# Patient Record
Sex: FEMALE
[De-identification: names, ages, dates, MRNs, and addresses within clinical notes are randomized per-mention and may not be internally consistent; named-entity substitution may affect disease eponyms.]

---

## 2021-09-28 ENCOUNTER — HOSPITAL ENCOUNTER (EMERGENCY)
Dept: HOSPITAL 5 - ED | Age: 26
LOS: 1 days | Discharge: HOME | End: 2021-09-29
Payer: COMMERCIAL

## 2021-09-28 DIAGNOSIS — Z20.2: ICD-10-CM

## 2021-09-28 DIAGNOSIS — N94.6: ICD-10-CM

## 2021-09-28 DIAGNOSIS — N39.0: ICD-10-CM

## 2021-09-28 DIAGNOSIS — N93.8: Primary | ICD-10-CM

## 2021-09-28 PROCEDURE — 81001 URINALYSIS AUTO W/SCOPE: CPT

## 2021-09-28 PROCEDURE — 99284 EMERGENCY DEPT VISIT MOD MDM: CPT

## 2021-09-28 PROCEDURE — 36415 COLL VENOUS BLD VENIPUNCTURE: CPT

## 2021-09-28 PROCEDURE — 85025 COMPLETE CBC W/AUTO DIFF WBC: CPT

## 2021-09-28 PROCEDURE — 96372 THER/PROPH/DIAG INJ SC/IM: CPT

## 2021-09-28 PROCEDURE — 84703 CHORIONIC GONADOTROPIN ASSAY: CPT

## 2021-09-28 PROCEDURE — 80053 COMPREHEN METABOLIC PANEL: CPT

## 2021-09-28 PROCEDURE — 87086 URINE CULTURE/COLONY COUNT: CPT

## 2021-09-28 PROCEDURE — 99283 EMERGENCY DEPT VISIT LOW MDM: CPT

## 2021-09-29 VITALS — SYSTOLIC BLOOD PRESSURE: 120 MMHG | DIASTOLIC BLOOD PRESSURE: 68 MMHG

## 2021-09-29 LAB
ALBUMIN SERPL-MCNC: 4.6 G/DL (ref 3.9–5)
ALT SERPL-CCNC: 12 UNITS/L (ref 7–56)
BASOPHILS # (AUTO): 0.1 K/MM3 (ref 0–0.1)
BASOPHILS NFR BLD AUTO: 0.7 % (ref 0–1.8)
BILIRUB UR QL STRIP: (no result)
BLOOD UR QL VISUAL: (no result)
BUN SERPL-MCNC: 14 MG/DL (ref 7–17)
BUN/CREAT SERPL: 23 %
CALCIUM SERPL-MCNC: 9.6 MG/DL (ref 8.4–10.2)
EOSINOPHIL # BLD AUTO: 0.3 K/MM3 (ref 0–0.4)
EOSINOPHIL NFR BLD AUTO: 3.8 % (ref 0–4.3)
HCT VFR BLD CALC: 36.8 % (ref 30.3–42.9)
HEMOLYSIS INDEX: 1
HGB BLD-MCNC: 12.4 GM/DL (ref 10.1–14.3)
LYMPHOCYTES # BLD AUTO: 2.4 K/MM3 (ref 1.2–5.4)
LYMPHOCYTES NFR BLD AUTO: 30.9 % (ref 13.4–35)
MCHC RBC AUTO-ENTMCNC: 34 % (ref 30–34)
MCV RBC AUTO: 84 FL (ref 79–97)
MONOCYTES # (AUTO): 0.5 K/MM3 (ref 0–0.8)
MONOCYTES % (AUTO): 6.4 % (ref 0–7.3)
MUCOUS THREADS #/AREA URNS HPF: (no result) /HPF
PH UR STRIP: 5 [PH] (ref 5–7)
PLATELET # BLD: 206 K/MM3 (ref 140–440)
PROT UR STRIP-MCNC: (no result) MG/DL
RBC # BLD AUTO: 4.4 M/MM3 (ref 3.65–5.03)
RBC #/AREA URNS HPF: 3 /HPF (ref 0–6)
UROBILINOGEN UR-MCNC: < 2 MG/DL (ref ?–2)
WBC #/AREA URNS HPF: 32 /HPF (ref 0–6)

## 2021-09-29 NOTE — EMERGENCY DEPARTMENT REPORT
ED Female  HPI





- General


Chief complaint: Vaginal Bleeding


Stated complaint: ABNORMAL VAGINAL BLEEDING,LOWER ABD PAIN


Source: patient


Mode of arrival: Ambulatory


Limitations: No Limitations





- History of Present Illness


Initial comments: 





Patient is a A4 25-year-old white female with no past medical history 

presents to the ED with complaint of acute onset persistent suprapubic pain and 

heavy vaginal bleeding for the last 2 weeks.  Patient states that in the last 5 

days she noticed that there were large blood clots with heavy bleeding.  Patient

states that her menstrual cycles usually last for 5 days and that they were 

never heavy.  Patient states that her last sexual intercourse was over 2 months 

ago and that she is not currently sexually active.  Patient states that at the 

time the sexual intercourse was protected.  Patient denies dysuria, urinary 

frequency and urgency, vaginal discharge, low back pain, chest pain, shortness 

of breath, diarrhea, nausea and vomiting, fever and chills or low back pain.


MD Complaint: vaginal bleeding, pelvic pain (suprapubic), other 

(Lightheadedness)


-: Sudden, week(s) (2)


Location: suprapubic, other (vaginal )


Radiation: non-radiating


Severity: severe


Severity scale (0 -10): 7


Quality: cramping, sharp


Consistency: constant


Improves with: none


Worsens with: menstrual period


Are you Pregnant Now?: No


Last Menstrual Period: 21


EDC: 22


Associated Symptoms: denies other symptoms, vaginal bleeding, abdominal pain 

(suprapubic), weakness, other (vaginal irritation).  denies: vaginal discharge, 

nausea/vomiting, fever/chills, headaches, loss of appetite, dysuria, hematuria, 

rash, seizure, shortness of breath, syncope





- Related Data


Sexually active: No


: 4


Para: 0


A: 4


                                  Previous Rx's











 Medication  Instructions  Recorded  Last Taken  Type


 


Doxycycline Hyclate 100 mg PO Q12H #28 capsule 21 Unknown Rx


 


Fluconazole [Diflucan TAB] 200 mg PO QDAY #2 tablet 21 Unknown Rx


 


Ibuprofen [Motrin] 800 mg PO Q8HR PRN #30 tablet 21 Unknown Rx


 


medroxyPROGESTERone ACETATE 10 mg PO DAILY #14 tablet 21 Unknown Rx





[Provera]    











                                    Allergies











Allergy/AdvReac Type Severity Reaction Status Date / Time


 


No Known Allergies Allergy   Verified 21 00:10














ED Review of Systems


ROS: 


Stated complaint: ABNORMAL VAGINAL BLEEDING,LOWER ABD PAIN


Other details as noted in HPI





Constitutional: denies: chills, fever


Eyes: denies: eye pain, eye discharge, vision change


ENT: denies: ear pain, throat pain


Respiratory: denies: cough, shortness of breath, wheezing


Cardiovascular: denies: chest pain, palpitations


Endocrine: no symptoms reported


Gastrointestinal: abdominal pain (suprapubic pain).  denies: nausea, vomiting, 

diarrhea


Genitourinary: abnormal menses (vaginal bleeding), other (vaginal irritation).  

denies: urgency, dysuria, frequency, hematuria, discharge


Musculoskeletal: denies: back pain, joint swelling, arthralgia


Skin: denies: rash, lesions


Neurological: denies: headache, weakness, paresthesias


Psychiatric: denies: anxiety, depression


Hematological/Lymphatic: denies: easy bleeding, easy bruising





ED Past Medical Hx





- Past Medical History


Previous Medical History?: No





- Medications


Home Medications: 


                                Home Medications











 Medication  Instructions  Recorded  Confirmed  Last Taken  Type


 


Doxycycline Hyclate 100 mg PO Q12H #28 capsule 21  Unknown Rx


 


Fluconazole [Diflucan TAB] 200 mg PO QDAY #2 tablet 21  Unknown Rx


 


Ibuprofen [Motrin] 800 mg PO Q8HR PRN #30 tablet 21  Unknown Rx


 


medroxyPROGESTERone ACETATE 10 mg PO DAILY #14 tablet 21  Unknown Rx





[Provera]     














ED Physical Exam





- General


Limitations: No Limitations


General appearance: alert, in no apparent distress





- Head


Head exam: Present: atraumatic, normocephalic, normal inspection





- Eye


Eye exam: Present: normal appearance, PERRL, EOMI


Pupils: Present: normal accommodation





- ENT


ENT exam: Present: normal exam, normal orophraynx, mucous membranes moist, TM's 

normal bilaterally, normal external ear exam





- Neck


Neck exam: Present: normal inspection, full ROM





- Respiratory


Respiratory exam: Present: normal lung sounds bilaterally.  Absent: respiratory 

distress, wheezes, rales, rhonchi, stridor, chest wall tenderness, accessory 

muscle use, decreased breath sounds, prolonged expiratory





- Cardiovascular


Cardiovascular Exam: Present: regular rate, normal rhythm, normal heart sounds. 

Absent: systolic murmur, diastolic murmur, rubs, gallop





- GI/Abdominal


GI/Abdominal exam: Present: soft, tenderness (Palpable mild suprapubic 

tenderness), normal bowel sounds.  Absent: guarding, rebound, hyperactive bowel 

sounds, hypoactive bowel sounds, organomegaly





- 


Bi-manual exam: Present: other (Pelvic exam declined)





- Extremities Exam


Extremities exam: Present: normal inspection, full ROM, normal capillary refill





- Back Exam


Back exam: Present: normal inspection, full ROM.  Absent: tenderness, CVA 

tenderness (R), CVA tenderness (L), muscle spasm, paraspinal tenderness, 

vertebral tenderness





- Neurological Exam


Neurological exam: Present: alert, oriented X3, CN II-XII intact, normal gait, 

reflexes normal





- Psychiatric


Psychiatric exam: Present: normal affect, normal mood





- Skin


Skin exam: Present: warm, dry, intact, normal color.  Absent: rash





ED Course


                                   Vital Signs











  21





  00:11


 


Temperature 98.8 F


 


Pulse Rate 60


 


Respiratory 20





Rate 


 


Blood Pressure 120/68


 


O2 Sat by Pulse 100





Oximetry 














ED Medical Decision Making





- Lab Data


Result diagrams: 


                                 21 00:50





                                 21 00:50





- Medical Decision Making





This is a A4 25-year-old white female with no past medical history presents 

to the ED with complaint of acute onset persistent suprapubic pain and heavy 

vaginal bleeding for the last 2 weeks.  Patient states that in the last 5 days 

she noticed that there were large blood clots with heavy bleeding.  Patient 

states that her menstrual cycles usually last for 5 days and that they were 

never heavy.  Patient states that her last sexual intercourse was over 2 months 

ago and that she is not currently sexually active.  Patient states that at the 

time the sexual intercourse was protected.  In the ED, patient is alert and 

oriented x3 and is not in any distress with normal vital signs.  Lab test r

esults were reviewed and are all nonactionable.  Patient was treated in the ED 

with medroxyprogesterone acetate 10 mg tablet, also treated for pain and 

empirically treated for suspected STD with Rocephin 1 g intramuscular injection.

 Patient was discharged home on a prescription of doxycycline 100 mg every 12 

hours for 14 days.  Patient was advised to follow-up with her OB/GYN physician 

in 7 to 10 days for reevaluation or return to the ED immediately if symptoms get

worse.





- Differential Diagnosis


UTI; dysmenorrhea; metrorrhagia; STD; bacterial vaginosis;


Critical care attestation.: 


If time is entered above; I have spent that time in minutes in the direct care 

of this critically ill patient, excluding procedure time.








ED Disposition


Clinical Impression: 


 Dysfunctional uterine hemorrhage, Severe dysmenorrhea, Acute urinary tract 

infection, Possible exposure to STD





Disposition: 01 HOME / SELF CARE / HOMELESS


Is pt being admited?: No


Does the pt Need Aspirin: No


Condition: Stable


Instructions:  Metrorrhagia, Easy-to-Read, Urinary Tract Infection, Adult, 

Easy-to-Read, Dysmenorrhea, Safe Sex, Menorrhagia, Easy-to-Read, Abnormal 

Uterine Bleeding, Easy-to-Read


Additional Instructions: 


All lab test results were reviewed and are all nonactionable except for 

urinalysis that showed significant urinary tract infection.  Given the risk of 

exposure to STD, you are being treated also empirically for suspected STD.  

Therefore take medication with food, drink plenty of fluids and follow-up with 

your OB/GYN physician in 7 to 10 days for reevaluation.  Return to the ED 

immediately if symptoms get worse.


Prescriptions: 


Fluconazole [Diflucan TAB] 200 mg PO QDAY #2 tablet


Doxycycline Hyclate 100 mg PO Q12H #28 capsule


Ibuprofen [Motrin] 800 mg PO Q8HR PRN #30 tablet


 PRN Reason: Pain , Severe (7-10)


medroxyPROGESTERone ACETATE [Provera] 10 mg PO DAILY #14 tablet


Referrals: 


KATIE HUANG MD [Staff Physician] - 3-5 Days


Forms:  Work/School Release Form(ED)


Time of Disposition: 01:00


Print Language: ENGLISH

## 2024-02-18 ENCOUNTER — APPOINTMENT (OUTPATIENT)
Dept: RADIOLOGY | Facility: HOSPITAL | Age: 29
End: 2024-02-18
Payer: MEDICAID

## 2024-02-18 ENCOUNTER — HOSPITAL ENCOUNTER (EMERGENCY)
Facility: HOSPITAL | Age: 29
Discharge: HOME | End: 2024-02-18
Attending: EMERGENCY MEDICINE
Payer: MEDICAID

## 2024-02-18 ENCOUNTER — APPOINTMENT (OUTPATIENT)
Dept: CARDIOLOGY | Facility: HOSPITAL | Age: 29
End: 2024-02-18
Payer: MEDICAID

## 2024-02-18 VITALS
OXYGEN SATURATION: 99 % | TEMPERATURE: 100.2 F | DIASTOLIC BLOOD PRESSURE: 77 MMHG | HEIGHT: 67 IN | WEIGHT: 220 LBS | HEART RATE: 79 BPM | BODY MASS INDEX: 34.53 KG/M2 | RESPIRATION RATE: 16 BRPM | SYSTOLIC BLOOD PRESSURE: 131 MMHG

## 2024-02-18 DIAGNOSIS — J10.1 INFLUENZA A: Primary | ICD-10-CM

## 2024-02-18 DIAGNOSIS — R05.1 ACUTE COUGH: ICD-10-CM

## 2024-02-18 LAB
FLUAV RNA RESP QL NAA+PROBE: DETECTED
FLUBV RNA RESP QL NAA+PROBE: NOT DETECTED
RSV RNA RESP QL NAA+PROBE: NOT DETECTED
SARS-COV-2 RNA RESP QL NAA+PROBE: NOT DETECTED

## 2024-02-18 PROCEDURE — 93005 ELECTROCARDIOGRAM TRACING: CPT

## 2024-02-18 PROCEDURE — 2500000001 HC RX 250 WO HCPCS SELF ADMINISTERED DRUGS (ALT 637 FOR MEDICARE OP): Performed by: EMERGENCY MEDICINE

## 2024-02-18 PROCEDURE — 87637 SARSCOV2&INF A&B&RSV AMP PRB: CPT | Performed by: NURSE PRACTITIONER

## 2024-02-18 PROCEDURE — 71046 X-RAY EXAM CHEST 2 VIEWS: CPT

## 2024-02-18 PROCEDURE — 99283 EMERGENCY DEPT VISIT LOW MDM: CPT | Mod: 25

## 2024-02-18 RX ORDER — IBUPROFEN 600 MG/1
600 TABLET ORAL ONCE
Status: COMPLETED | OUTPATIENT
Start: 2024-02-18 | End: 2024-02-18

## 2024-02-18 RX ORDER — ACETAMINOPHEN 325 MG/1
650 TABLET ORAL ONCE
Status: COMPLETED | OUTPATIENT
Start: 2024-02-18 | End: 2024-02-18

## 2024-02-18 RX ORDER — BENZONATATE 100 MG/1
100 CAPSULE ORAL EVERY 8 HOURS
Qty: 21 CAPSULE | Refills: 0 | Status: SHIPPED | OUTPATIENT
Start: 2024-02-18 | End: 2024-02-25

## 2024-02-18 RX ADMIN — IBUPROFEN 600 MG: 600 TABLET, FILM COATED ORAL at 19:21

## 2024-02-18 RX ADMIN — ACETAMINOPHEN 650 MG: 325 TABLET ORAL at 19:21

## 2024-02-18 ASSESSMENT — PAIN SCALES - GENERAL: PAINLEVEL_OUTOF10: 10 - WORST POSSIBLE PAIN

## 2024-02-18 ASSESSMENT — PAIN DESCRIPTION - DESCRIPTORS
DESCRIPTORS: PRESSURE;ACHING
DESCRIPTORS: BURNING
DESCRIPTORS: ACHING;PRESSURE
DESCRIPTORS: BURNING

## 2024-02-18 ASSESSMENT — PAIN - FUNCTIONAL ASSESSMENT: PAIN_FUNCTIONAL_ASSESSMENT: 0-10

## 2024-02-18 ASSESSMENT — COLUMBIA-SUICIDE SEVERITY RATING SCALE - C-SSRS
6. HAVE YOU EVER DONE ANYTHING, STARTED TO DO ANYTHING, OR PREPARED TO DO ANYTHING TO END YOUR LIFE?: NO
1. IN THE PAST MONTH, HAVE YOU WISHED YOU WERE DEAD OR WISHED YOU COULD GO TO SLEEP AND NOT WAKE UP?: NO
2. HAVE YOU ACTUALLY HAD ANY THOUGHTS OF KILLING YOURSELF?: NO

## 2024-02-18 ASSESSMENT — LIFESTYLE VARIABLES
EVER FELT BAD OR GUILTY ABOUT YOUR DRINKING: NO
EVER HAD A DRINK FIRST THING IN THE MORNING TO STEADY YOUR NERVES TO GET RID OF A HANGOVER: NO
HAVE PEOPLE ANNOYED YOU BY CRITICIZING YOUR DRINKING: NO
HAVE YOU EVER FELT YOU SHOULD CUT DOWN ON YOUR DRINKING: NO

## 2024-02-18 ASSESSMENT — PAIN DESCRIPTION - LOCATION: LOCATION: CHEST

## 2024-02-18 ASSESSMENT — PAIN DESCRIPTION - PROGRESSION: CLINICAL_PROGRESSION: NOT CHANGED

## 2024-02-18 ASSESSMENT — PAIN DESCRIPTION - PAIN TYPE: TYPE: ACUTE PAIN

## 2024-02-18 ASSESSMENT — PAIN DESCRIPTION - ONSET: ONSET: ONGOING

## 2024-02-18 ASSESSMENT — PAIN DESCRIPTION - FREQUENCY: FREQUENCY: CONSTANT/CONTINUOUS

## 2024-02-18 NOTE — ED TRIAGE NOTES
TRIAGE NOTE   I saw the patient as the Clinician in Triage and performed a brief history and physical exam, established acuity, and ordered appropriate tests to develop basic plan of care. Patient will be seen by an [facility], resident and/or physician who will independently evaluate the patient. Please see subsequent provider notes for further details and disposition.     Brief HPI: In brief, Carmen Zhu is a 28 y.o. female that presents for cough, chest pain, chest congestion ongoing for the last 5 to 7 days.  She initially started with fevers.  Denies nausea vomiting diarrhea.  Former smoker recently quitting.    Focused Physical exam:   CONSTITUTIONAL: Vital signs reviewed as charted, well-developed and in no distress  Eyes: Extraocular muscles are intact. Pupils equal round and reactive to light. Conjunctiva are pink.    ENT: Mucous membranes are moist. Tongue in the midline. Pharynx was without erythema or exudates, uvula midline  LUNGS: Breath sounds equal and clear to auscultation. Good air exchange, no wheezes rales or retractions, pulse oximetry is charted.  HEART: Regular rate and rhythm without murmur thrill or rub, strong tones, auscultation is normal.  ABDOMEN: Soft and nontender without guarding rebound rigidity or mass. Bowel sounds are present and normal in all quadrants. There is no palpable masses or aneurysms identified. No hepatosplenomegaly, normal abdominal exam.  Neuro: The patient is awake, alert and oriented ×3. Moving all 4 extremities and answering questions appropriately.   MUSCULOSKELETAL: The calves are nontender to palpation. Full gross active range of motion.   PSYCH: Awake alert oriented, normal mood and affect.  Skin:  Dry, normal color, warm to the touch, no rash present.        Plan/MDM:   Viral swabs, chest x-ray, EKG  Please see subsequent provider note for further details and disposition

## 2024-02-19 LAB
ATRIAL RATE: 98 BPM
P AXIS: 77 DEGREES
P OFFSET: 215 MS
P ONSET: 156 MS
PR INTERVAL: 138 MS
Q ONSET: 225 MS
QRS COUNT: 16 BEATS
QRS DURATION: 88 MS
QT INTERVAL: 332 MS
QTC CALCULATION(BAZETT): 423 MS
QTC FREDERICIA: 391 MS
R AXIS: 99 DEGREES
T AXIS: 31 DEGREES
T OFFSET: 391 MS
VENTRICULAR RATE: 98 BPM

## 2024-02-19 NOTE — DISCHARGE INSTRUCTIONS
Be sure to take all medications, over the counter medications or prescription medications only as directed.    Be sure to follow up as directed in 1-2 days. All of the details of your follow up instructions are detailed in the follow up section of this packet.    If you are being discharged with any pains medications or muscle relaxers (norco, Vicodin, hydrocodone products, Percocet, oxycodone products, flexeril, cyclobenzaprine, robaxin, norflex, brand or generic, or any other pain controlling medications with the exception of Ibuprofen and regular Tylenol, do not drive or operate machinery, climb ladders or participate in any activity that could potentially put yourself or others at risk should you get dizzy, or be/feel impaired at all.    Return to emergency room without delay for ANY new or worsening pains or for any other symptoms or concerns. Return with worsening pains, nausea, vomiting, trouble breathing, palpitations, shortness of breath, inability to pass stool or urine, loss of control of stool or urine, any numbness or tingling (that is not normal for you), uncontrolled fevers, the passing of blood or other material in stool or urine, rashes, pains or for any other symptoms or concerns you may have. You are always welcome to return to the ER at any time for any reason or for any other concerns you may have.

## 2024-02-19 NOTE — ED PROVIDER NOTES
HPI   Chief Complaint   Patient presents with    Flu Symptoms     Sick for a week with fever and chills, congestion and weak. Now has chest pain.    Chest Pain       Patient is a 28-year-old female with no significant past medical history presenting with cold-like symptoms x 1 week.  Says that she has had sinus headache, mucus producing cough, runny nose, sore throat, decreased appetite.  States that when she coughs she does have chest wall pain.  Says that she has been trying to force herself to eat and drink.  Endorses a fever of as high as 104F at home.  She denies shortness of breath, abdominal pain, nausea, vomiting, diarrhea.  That she has tried over-the-counter medicine like Tylenol, Mucinex, NyQuil which do help alleviate some of her symptoms.  Her last dose of over-the-counter medicine was earlier today, sometime in the morning.  Does endorse being around sick contacts at work.                          Sidney Coma Scale Score: 15                     Patient History   No past medical history on file.  No past surgical history on file.  No family history on file.  Social History     Tobacco Use    Smoking status: Not on file    Smokeless tobacco: Not on file   Substance Use Topics    Alcohol use: Not on file    Drug use: Not on file       Physical Exam   ED Triage Vitals [02/18/24 1853]   Temperature Heart Rate Respirations BP   37.9 °C (100.2 °F) 83 18 124/83      Pulse Ox Temp Source Heart Rate Source Patient Position   100 % Tympanic -- Sitting      BP Location FiO2 (%)     Right arm --       Physical Exam  Constitutional:       Appearance: She is well-developed.      Comments: Awake, tearful, sitting in examination chair, in no acute distress   HENT:      Head: Normocephalic and atraumatic.      Nose: Congestion present.      Mouth/Throat:      Mouth: Mucous membranes are moist.      Pharynx: Oropharynx is clear.   Eyes:      Extraocular Movements: Extraocular movements intact.      Pupils: Pupils are  equal, round, and reactive to light.   Cardiovascular:      Rate and Rhythm: Normal rate and regular rhythm.      Heart sounds: Normal heart sounds.   Pulmonary:      Effort: Pulmonary effort is normal.      Breath sounds: Normal breath sounds. No decreased breath sounds or wheezing.   Abdominal:      General: Bowel sounds are normal.      Palpations: Abdomen is soft.   Musculoskeletal:         General: Normal range of motion.      Cervical back: Normal range of motion and neck supple.   Skin:     General: Skin is warm and dry.      Capillary Refill: Capillary refill takes less than 2 seconds.   Neurological:      General: No focal deficit present.      Mental Status: She is alert and oriented to person, place, and time.   Psychiatric:         Mood and Affect: Mood normal.         Behavior: Behavior normal.         ED Course & MDM   Diagnoses as of 02/18/24 2100   Influenza A   Acute cough       Medical Decision Making  Patient is a 28-year old female with no significant past medical history presenting with cold-like symptoms x 1 week.  Chest x-ray, viral swabs ordered.  Patient is febrile in the emergency department, ibuprofen/Tylenol ordered.  Conditions considered include but not limited to: COVID, RSV, influenza, pneumonia.    COVID, RSV both negative.  Influenza A is positive.  Chest x-ray shows no acute cardiopulmonary process.  I believe this patient is at low risk for complication, and a disoposition of discharge is acceptable.  Return to the Emergency Department if new or worsening symptoms including headache, fever, chills, chest pain, shortness of breath, syncope, near syncope, abdominal pain, nausea, vomiting,  diarrhea, or worsening pain.  Megha with patient that she does have influenza.  She is requesting something for cough, Tessalon Perles will be prescribed.  Recommended that she increase her fluid intake.  Also discussed that this is viral and will clear on its own.  Recommended she follow-up with  primary care.  She is agreeable to disposition of discharge with follow-up primary care and to take Tessalon Perles as needed for cough.    Portions of this note made with Dragon software, please be mindful of potential grammatical errors.        Medications   acetaminophen (Tylenol) tablet 650 mg (650 mg oral Given 2/18/24 1921)   ibuprofen tablet 600 mg (600 mg oral Given 2/18/24 1921)         Labs Reviewed   INFLUENZA A AND B PCR - Abnormal       Result Value    Flu A Result Detected (*)     Flu B Result Not Detected      Narrative:     This assay is an in vitro diagnostic multiplex nucleic acid amplification test for the detection and discrimination of Influenza A & B from nasopharyngeal specimens, and has been validated for use at [facility]. Negative results do not preclude Influenza A/B infections, and should not be used as the sole basis for diagnosis, treatment, or other management decisions. If Influenza A/B and RSV PCR results are negative, testing for Parainfluenza virus, Adenovirus and Metapneumovirus is routinely performed for [facility] pediatric oncology and intensive care inpatients, and is available on other patients by placing an add-on request.   SARS-COV-2 PCR - Normal    Coronavirus 2019, PCR Not Detected      Narrative:     This assay has received FDA Emergency Use Authorization (EUA) and is only authorized for the duration of time that circumstances exist to justify the authorization of the emergency use of in vitro diagnostic tests for the detection of SARS-CoV-2 virus and/or diagnosis of COVID-19 infection under section 564(b)(1) of the Act, 21 U.S.C. 360bbb-3(b)(1). This assay is an in vitro diagnostic nucleic acid amplification test for the qualitative detection of SARS-CoV-2 from nasopharyngeal specimens and has been validated for use at [facility]. Negative results do not preclude COVID-19 infections and should not be used as the sole basis for  diagnosis, treatment, or other management decisions.     RSV PCR - Normal    RSV PCR Not Detected      Narrative:     This assay is an FDA-cleared, in vitro diagnostic nucleic acid amplification test for the detection of RSV from nasopharyngeal specimens, and has been validated for use at [facility]. Negative results do not preclude RSV infections, and should not be used as the sole basis for diagnosis, treatment, or other management decisions. If Influenza A/B and RSV PCR results are negative, testing for Parainfluenza virus, Adenovirus and Metapneumovirus is routinely performed for pediatric oncology and intensive care inpatients at [facility], and is available on other patients by placing an add-on request.             XR chest 2 views   Final Result   No acute cardiopulmonary process.             Signed by: Maverick Marshall 2/18/2024 7:47 PM   Dictation workstation:   [id number]            Procedure  Procedures     Luis F Butler PA-C  02/18/24 2102